# Patient Record
Sex: FEMALE | Race: WHITE | Employment: OTHER | URBAN - METROPOLITAN AREA
[De-identification: names, ages, dates, MRNs, and addresses within clinical notes are randomized per-mention and may not be internally consistent; named-entity substitution may affect disease eponyms.]

---

## 2019-07-14 ENCOUNTER — APPOINTMENT (OUTPATIENT)
Dept: GENERAL RADIOLOGY | Facility: CLINIC | Age: 74
End: 2019-07-14
Attending: EMERGENCY MEDICINE
Payer: MEDICARE

## 2019-07-14 ENCOUNTER — APPOINTMENT (OUTPATIENT)
Dept: ULTRASOUND IMAGING | Facility: CLINIC | Age: 74
End: 2019-07-14
Attending: EMERGENCY MEDICINE
Payer: MEDICARE

## 2019-07-14 ENCOUNTER — HOSPITAL ENCOUNTER (EMERGENCY)
Facility: CLINIC | Age: 74
Discharge: HOME | End: 2019-07-14
Attending: EMERGENCY MEDICINE | Admitting: EMERGENCY MEDICINE
Payer: MEDICARE

## 2019-07-14 VITALS
WEIGHT: 200 LBS | HEART RATE: 92 BPM | OXYGEN SATURATION: 98 % | DIASTOLIC BLOOD PRESSURE: 96 MMHG | SYSTOLIC BLOOD PRESSURE: 180 MMHG | RESPIRATION RATE: 20 BRPM | TEMPERATURE: 98.4 F

## 2019-07-14 DIAGNOSIS — R03.0 ELEVATED BLOOD PRESSURE READING WITHOUT DIAGNOSIS OF HYPERTENSION: ICD-10-CM

## 2019-07-14 DIAGNOSIS — R60.0 LOWER EXTREMITY EDEMA: ICD-10-CM

## 2019-07-14 LAB
ALBUMIN SERPL-MCNC: 4.3 G/DL (ref 3.4–5)
ALP SERPL-CCNC: 97 U/L (ref 40–150)
ALT SERPL W P-5'-P-CCNC: 27 U/L (ref 0–50)
ANION GAP SERPL CALCULATED.3IONS-SCNC: 6 MMOL/L (ref 3–14)
AST SERPL W P-5'-P-CCNC: 21 U/L (ref 0–45)
BASOPHILS # BLD AUTO: 0.1 10E9/L (ref 0–0.2)
BASOPHILS NFR BLD AUTO: 0.6 %
BILIRUB SERPL-MCNC: 0.5 MG/DL (ref 0.2–1.3)
BUN SERPL-MCNC: 16 MG/DL (ref 7–30)
CALCIUM SERPL-MCNC: 9.3 MG/DL (ref 8.5–10.1)
CHLORIDE SERPL-SCNC: 108 MMOL/L (ref 94–109)
CO2 SERPL-SCNC: 26 MMOL/L (ref 20–32)
CREAT SERPL-MCNC: 0.89 MG/DL (ref 0.52–1.04)
CRP SERPL-MCNC: <2.9 MG/L (ref 0–8)
DIFFERENTIAL METHOD BLD: NORMAL
EOSINOPHIL # BLD AUTO: 0.3 10E9/L (ref 0–0.7)
EOSINOPHIL NFR BLD AUTO: 3.4 %
ERYTHROCYTE [DISTWIDTH] IN BLOOD BY AUTOMATED COUNT: 13.2 % (ref 10–15)
ERYTHROCYTE [SEDIMENTATION RATE] IN BLOOD BY WESTERGREN METHOD: 6 MM/H (ref 0–30)
GFR SERPL CREATININE-BSD FRML MDRD: 64 ML/MIN/{1.73_M2}
GLUCOSE SERPL-MCNC: 104 MG/DL (ref 70–99)
HCT VFR BLD AUTO: 45.8 % (ref 35–47)
HGB BLD-MCNC: 15.3 G/DL (ref 11.7–15.7)
IMM GRANULOCYTES # BLD: 0 10E9/L (ref 0–0.4)
IMM GRANULOCYTES NFR BLD: 0.5 %
LYMPHOCYTES # BLD AUTO: 1.8 10E9/L (ref 0.8–5.3)
LYMPHOCYTES NFR BLD AUTO: 20.2 %
MCH RBC QN AUTO: 31.2 PG (ref 26.5–33)
MCHC RBC AUTO-ENTMCNC: 33.4 G/DL (ref 31.5–36.5)
MCV RBC AUTO: 94 FL (ref 78–100)
MONOCYTES # BLD AUTO: 0.7 10E9/L (ref 0–1.3)
MONOCYTES NFR BLD AUTO: 8.2 %
NEUTROPHILS # BLD AUTO: 5.9 10E9/L (ref 1.6–8.3)
NEUTROPHILS NFR BLD AUTO: 67.1 %
NRBC # BLD AUTO: 0 10*3/UL
NRBC BLD AUTO-RTO: 0 /100
PLATELET # BLD AUTO: 233 10E9/L (ref 150–450)
POTASSIUM SERPL-SCNC: 3.9 MMOL/L (ref 3.4–5.3)
PROT SERPL-MCNC: 7.8 G/DL (ref 6.8–8.8)
RBC # BLD AUTO: 4.9 10E12/L (ref 3.8–5.2)
SODIUM SERPL-SCNC: 140 MMOL/L (ref 133–144)
WBC # BLD AUTO: 8.9 10E9/L (ref 4–11)

## 2019-07-14 PROCEDURE — 25000128 H RX IP 250 OP 636: Performed by: EMERGENCY MEDICINE

## 2019-07-14 PROCEDURE — 96375 TX/PRO/DX INJ NEW DRUG ADDON: CPT

## 2019-07-14 PROCEDURE — 85025 COMPLETE CBC W/AUTO DIFF WBC: CPT | Performed by: EMERGENCY MEDICINE

## 2019-07-14 PROCEDURE — 99285 EMERGENCY DEPT VISIT HI MDM: CPT | Mod: 25

## 2019-07-14 PROCEDURE — 85652 RBC SED RATE AUTOMATED: CPT | Performed by: EMERGENCY MEDICINE

## 2019-07-14 PROCEDURE — 73610 X-RAY EXAM OF ANKLE: CPT | Mod: RT

## 2019-07-14 PROCEDURE — 93971 EXTREMITY STUDY: CPT | Mod: RT

## 2019-07-14 PROCEDURE — 86140 C-REACTIVE PROTEIN: CPT | Performed by: EMERGENCY MEDICINE

## 2019-07-14 PROCEDURE — 96374 THER/PROPH/DIAG INJ IV PUSH: CPT

## 2019-07-14 PROCEDURE — 80053 COMPREHEN METABOLIC PANEL: CPT | Performed by: EMERGENCY MEDICINE

## 2019-07-14 RX ORDER — LIDOCAINE 40 MG/G
CREAM TOPICAL
Status: DISCONTINUED | OUTPATIENT
Start: 2019-07-14 | End: 2019-07-15 | Stop reason: HOSPADM

## 2019-07-14 RX ORDER — LABETALOL 20 MG/4 ML (5 MG/ML) INTRAVENOUS SYRINGE
20 ONCE
Status: COMPLETED | OUTPATIENT
Start: 2019-07-14 | End: 2019-07-14

## 2019-07-14 RX ORDER — HYDRALAZINE HYDROCHLORIDE 20 MG/ML
5 INJECTION INTRAMUSCULAR; INTRAVENOUS ONCE
Status: COMPLETED | OUTPATIENT
Start: 2019-07-14 | End: 2019-07-14

## 2019-07-14 RX ADMIN — LABETALOL 20 MG/4 ML (5 MG/ML) INTRAVENOUS SYRINGE 20 MG: at 22:29

## 2019-07-14 RX ADMIN — HYDRALAZINE HYDROCHLORIDE 5 MG: 20 INJECTION INTRAMUSCULAR; INTRAVENOUS at 20:07

## 2019-07-14 ASSESSMENT — ENCOUNTER SYMPTOMS
JOINT SWELLING: 1
FEVER: 0
ARTHRALGIAS: 0
CHILLS: 0

## 2019-07-14 NOTE — ED AVS SNAPSHOT
[facility] Emergency Department  [address]  [facility] [address]  Phone:  [phone]  Fax:  [phone]                                    Mali James   MRN: [id number]    Department:  [facility] Emergency Department   Date of Visit:  7/14/2019           After Visit Summary Signature Page    I have received my discharge instructions, and my questions have been answered. I have discussed any challenges I see with this plan with the nurse or doctor.    ..........................................................................................................................................  Patient/Patient Representative Signature      ..........................................................................................................................................  Patient Representative Print Name and Relationship to Patient    ..................................................               ................................................  Date                                   Time    ..........................................................................................................................................  Reviewed by Signature/Title    ...................................................              ..............................................  Date                                               Time          22EPIC Rev 08/18

## 2019-07-15 NOTE — ED PROVIDER NOTES
History     Chief Complaint:  Joint swelling and hypertension    HPI   Mali James is a 73 year old female with a history of hypertension, hyperlipidemia, and cancer who presents to the emergency department for evaluation of ankle swelling and hypertension. The patient reports that earlier today, she noticed that her right ankle was swelling. She presented to urgent care where she was told that both ankles were swollen and that she might have an infection, prompting her visit. She denies any fever, chills or pain. She mentions that she had kidney cancer surgery in 2004 and that she thinks her right leg has been slightly larger than the right since; she states as well that she can walk without any joint pain and feels that the issue is not related to her ankles or feet but to swollen tissues.  She also worked in the garden all weekend and states she got bit up by the gnats on her legs since she was wearing shorts.  They do itch some.  Lastly, she states that her blood pressure was last checked at her PCP office in May and it was elevated, but she and her doctor opted to keep an eye on it.  The urgent care doctor who saw her today sent her here because of the swelling and the increased blood pressure.  The patient states she has had not chest pain, shortness of breath, swelling about her belt line.  She denies headaches, blurred vision.  In general, she states that she feels well.    Allergies:  No Known Drug Allergies    Medications:    Lisinopril     Past Medical History:    Kidney cancer  Hypertension  Hyperlipidemia    Past Surgical History:    Rib removal, to remove kidney cancer (per patient)    Family History:    Breast cancer    Social History:  The patient presents alone.  Smoking Status: Never  Alcohol Use: Not on file   Drug Use: Not on file  Marital Status:       Review of Systems   Constitutional: Negative for chills and fever.   Musculoskeletal: Positive for joint swelling. Negative for  arthralgias.   All other systems reviewed and are negative.        Physical Exam   Vitals:  Patient Vitals for the past 24 hrs:   BP Temp Temp src Pulse Heart Rate Resp SpO2 Weight   07/14/19 2228 (!) 196/100 -- -- -- 90 -- 99 % --   07/14/19 2122 (!) 192/108 -- -- 96 -- -- 98 % --   07/14/19 2030 (!) 184/92 -- -- 82 84 -- 100 % --   07/14/19 2007 (!) 207/103 -- -- -- -- -- -- --   07/14/19 2000 (!) 207/103 -- -- 87 -- -- 98 % --   07/14/19 1932 (!) 239/120 98.4  F (36.9  C) Temporal 76 -- 20 100 % 90.7 kg (200 lb)     Physical Exam  General: Patient is well appearing and relaxed.  HEENT:   The scalp and head appear normal    The pupils are equal, round, and reactive to light    Extraocular muscles are intact.    The nose is normal.    The oropharynx is normal.      Uvula is in the midline.    Neck:  Normal range of motion.    Lungs:  Clear.      No rales, no wheezing.      There is no tachypnea.  Non-labored.  Cardiac: Regular rate.      Normal S1. Loud S2    No pathological murmur/rub    Abdomen: Soft. No distension, no localized tenderness or rebound.  MS:  Normal tone. Normal movement of all extremities.   Neurologic:     Normal mentation.  No cranial nerve deficits.  No focal motor or sensory                            changes.      Speech normal.  Psych:  Awake.     Alert.      Normal affect.      Appropriate interactions.  Skin:  No rash.      No lesions.    1+ pitting edema over right ankle, dorsum of foot, and right leg at baseline is slightly swollen compared to the left  Early venous stasis changes in the pretibial regions right leg more than left and also has numerous bug bites over the both lower extremities.       Emergency Department Course     Imaging:  Radiographic findings were communicated with the patient who voiced understanding of the findings.    US Lower Extremity Venous Duplex Right  No DVT demonstrated.  Reading per radiology.    XR Ankle G/E 3 views, right  No acute osseous abnormality  demonstrated.  Reading per radiology.    Laboratory:  CBC: WNL. (WBC 8.9, HGB 15.3, )  CRP Inflammation: <2.9    Erthrocyte sedimentation: 6  CMP: Glucose 104 (H) o/w AWNL (Creatinine 0.89)    Interventions:  2007 Apresoline 5 mg IV  2229 Normodyne 20 mg IV    Emergency Department Course:  Nursing notes and vitals reviewed. 1948 I performed an exam of the patient as documented above.     IV inserted. Medicine administered as documented above. Blood drawn. This was sent to the lab for further testing, results above.    The patient was sent for a lower extremity ultrasound and ankle x-ray while in the emergency department, findings above.     2225 I rechecked and updated the patient.    2237 I rechecked the patient and discussed the results of her workup thus far.     Findings and plan explained to the Patient. Patient discharged home with instructions regarding supportive care, medications, and reasons to return. The importance of close follow-up was reviewed.     I personally reviewed the laboratory results with the Patient and answered all related questions prior to discharge.     Impression & Plan      Medical Decision Making:  Mali James is a 73 year old female who presented with a swollen and ankle and increased blood pressure from clinic. She stated that she is on lisinopril, but was lady time she was in the office in May they said her blood pressure was elevated and to keep an eye on it. She denies any headache, chest pain, or shortness of breath. She simple went into clinic today to have her right ankle examined because it seemed to be more swollen as did the foot. She denied any acute injury and says she always has some swelling in this right lower extremity. It seems more pronounced today than usual. X-ray was negative for bony abnormality or effusion and ultrasound was negative for DVT. She does have numerous bug bites. This could be an inflammatory response to all of the bug bites on her legs.  It also could be because she was on her feet all day working in the garden. In any event, we discussed her pitting edema and her venous stasis changes (right greater than left). I don't see any evidence of cellulitis though. The doctor who sent her over was concerned for cellulitis because the right lower leg seemed a little bit warmer than the left left. There really is no increased erythema nor induration to suggest infection.    We discussed elevation, perhaps a compression stocking on this side and follow up with her PCP this week.  They can recheck her blood pressure and discuss pedall edema at more length.     Diagnosis:    ICD-10-CM    1. Elevated blood pressure reading without diagnosis of hypertension R03.0    2. Lower extremity edema R60.0        Disposition:  discharged to home    Discharge Medications:     Medication List      There are no discharge medications for this visit.       I, Ronnie Milligan, am serving as a scribe on 7/14/2019 at 10:23 PM to personally document services performed by Jarvis Montilla MD based on my observations and the provider's statements to me.     Ronnie Milligan  7/14/2019   [facility] EMERGENCY DEPARTMENT       Jarvis Montilla MD  07/15/19 3750

## 2019-07-15 NOTE — DISCHARGE INSTRUCTIONS
Discharge Instructions  Hypertension - High Blood Pressure    During you visit to the Emergency Department, your blood pressure was higher than the recommended blood pressure.  This may be related to stress, pain, medication or other temporary conditions. In these cases, your blood pressure may return to normal on its own. If you have a history of high blood pressure, you may need to have your doctor adjust your medications. Sometimes, your high measurement here may indicate that you have developed high blood pressure that will stay high unless it is treated. Sudden very high blood pressure can cause problems, but usually high blood pressure causes problems over months to years.      Blood pressure is almost never lowered in the Emergency Department, because studies have shown that lowering blood pressure too quickly is much more dangerous than leaving it alone.    You need to follow up with your doctor in 1-3 days to get your blood pressure rechecked.     Return to the Emergency Department if you start to have:  A severe headache.  Chest pain.  Shortness of breath.  Weakness or numbness that affects one part of the body.  Confusion.  Vision changes.  Significant swelling of legs and/or eyes.  A reaction to any medication started in the Emergency Department.    What can I do to help myself?  Avoid alcohol.  Take any blood pressure medicine that you are prescribed.  Get a good night s sleep.  Lower your salt intake.  Exercise.  Lose weight.  Manage stress.    If blood pressure medication was started in the Emergency Department:  The medicine may not have an immediate effect. The body and brain determine what blood pressure you have. The medicine s job is to retrain the body s  thermostat  to a lower blood pressure.  You will need to follow up with your doctor to see how this medicine is working for you.  If you were given a prescription for medicine here today, be sure to read all of the information (including the  package insert) that comes with your prescription.  This will include important information about the medicine, its side effects, and any warnings that you need to know about.  The pharmacist who fills the prescription can provide more information and answer questions you may have about the medicine.  If you have questions or concerns that the pharmacist cannot address, please call or return to the Emergency Department.   Opioid Medication Information    Pain medications are among the most commonly prescribed medicines, so we are including this information for all our patients. If you did not receive pain medication or get a prescription for pain medicine, you can ignore it.     You may have been given a prescription for an opioid (narcotic) pain medicine and/or have received a pain medicine while here in the Emergency Department. These medicines can make you drowsy or impaired. You must not drive, operate dangerous equipment, or engage in any other dangerous activities while taking these medications. If you drive while taking these medications, you could be arrested for DUI, or driving under the influence. Do not drink any alcohol while you are taking these medications.     Opioid pain medications can cause addiction. If you have a history of chemical dependency of any type, you are at a higher risk of becoming addicted to pain medications.  Only take these prescribed medications to treat your pain when all other options have been tried. Take it for as short a time and as few doses as possible. Store your pain pills in a secure place, as they are frequently stolen and provide a dangerous opportunity for children or visitors in your house to start abusing these powerful medications. We will not replace any lost or stolen medicine.  As soon as your pain is better, you should flush all your remaining medication.     Many prescription pain medications contain Tylenol  (acetaminophen), including Vicodin , Tylenol #3 ,  Norco , Lortab , and Percocet .  You should not take any extra pills of Tylenol  if you are using these prescription medications or you can get very sick.  Do not ever take more than 3000 mg of acetaminophen in any 24 hour period.    All opioids tend to cause constipation. Drink plenty of water and eat foods that have a lot of fiber, such as fruits, vegetables, prune juice, apple juice and high fiber cereal.  Take a laxative if you don t move your bowels at least every other day. Miralax , Milk of Magnesia, Colace , or Senna  can be used to keep you regular.      Remember that you can always come back to the Emergency Department if you are not able to see your regular doctor in the amount of time listed above, if you get any new symptoms, or if there is anything that worries you.

## 2019-07-15 NOTE — ED TRIAGE NOTES
Patient noted swelling to right ankle today.  Seen at  where they also noted High BP.      Sent here for eval of ankle and BP.      ABCs intact.  Alert and oriented x 3.

## 2019-07-15 NOTE — ED NOTES
Pt reports rt ankle swelling today, states it swells a little once in a while but usually resolves. Pt does not check her BP at home, has not missed any meds.

## 2020-09-08 ENCOUNTER — APPOINTMENT (OUTPATIENT)
Dept: CT IMAGING | Facility: CLINIC | Age: 75
End: 2020-09-08
Attending: EMERGENCY MEDICINE
Payer: MEDICARE

## 2020-09-08 ENCOUNTER — HOSPITAL ENCOUNTER (EMERGENCY)
Facility: CLINIC | Age: 75
Discharge: HOME | End: 2020-09-08
Attending: EMERGENCY MEDICINE | Admitting: EMERGENCY MEDICINE
Payer: MEDICARE

## 2020-09-08 VITALS
SYSTOLIC BLOOD PRESSURE: 135 MMHG | DIASTOLIC BLOOD PRESSURE: 84 MMHG | TEMPERATURE: 96.5 F | WEIGHT: 155 LBS | RESPIRATION RATE: 15 BRPM | OXYGEN SATURATION: 96 % | HEART RATE: 85 BPM

## 2020-09-08 DIAGNOSIS — N28.89 RIGHT KIDNEY MASS: ICD-10-CM

## 2020-09-08 DIAGNOSIS — R55 VASOVAGAL NEAR SYNCOPE: ICD-10-CM

## 2020-09-08 DIAGNOSIS — M62.830 BACK MUSCLE SPASM: ICD-10-CM

## 2020-09-08 LAB
ALBUMIN SERPL-MCNC: 3.8 G/DL (ref 3.4–5)
ALP SERPL-CCNC: 75 U/L (ref 40–150)
ALT SERPL W P-5'-P-CCNC: 58 U/L (ref 0–50)
ANION GAP SERPL CALCULATED.3IONS-SCNC: 4 MMOL/L (ref 3–14)
AST SERPL W P-5'-P-CCNC: 87 U/L (ref 0–45)
BASOPHILS # BLD AUTO: 0 10E9/L (ref 0–0.2)
BASOPHILS NFR BLD AUTO: 0.3 %
BILIRUB SERPL-MCNC: 0.4 MG/DL (ref 0.2–1.3)
BUN SERPL-MCNC: 22 MG/DL (ref 7–30)
CALCIUM SERPL-MCNC: 8.6 MG/DL (ref 8.5–10.1)
CHLORIDE SERPL-SCNC: 106 MMOL/L (ref 94–109)
CO2 SERPL-SCNC: 28 MMOL/L (ref 20–32)
CREAT SERPL-MCNC: 0.91 MG/DL (ref 0.52–1.04)
DIFFERENTIAL METHOD BLD: NORMAL
EOSINOPHIL # BLD AUTO: 0.2 10E9/L (ref 0–0.7)
EOSINOPHIL NFR BLD AUTO: 1.8 %
ERYTHROCYTE [DISTWIDTH] IN BLOOD BY AUTOMATED COUNT: 13.3 % (ref 10–15)
GFR SERPL CREATININE-BSD FRML MDRD: 62 ML/MIN/{1.73_M2}
GLUCOSE SERPL-MCNC: 115 MG/DL (ref 70–99)
HCT VFR BLD AUTO: 41.1 % (ref 35–47)
HGB BLD-MCNC: 14.1 G/DL (ref 11.7–15.7)
IMM GRANULOCYTES # BLD: 0 10E9/L (ref 0–0.4)
IMM GRANULOCYTES NFR BLD: 0.2 %
INTERPRETATION ECG - MUSE: NORMAL
LIPASE SERPL-CCNC: 289 U/L (ref 73–393)
LYMPHOCYTES # BLD AUTO: 2.1 10E9/L (ref 0.8–5.3)
LYMPHOCYTES NFR BLD AUTO: 21.4 %
MCH RBC QN AUTO: 31.9 PG (ref 26.5–33)
MCHC RBC AUTO-ENTMCNC: 34.3 G/DL (ref 31.5–36.5)
MCV RBC AUTO: 93 FL (ref 78–100)
MONOCYTES # BLD AUTO: 0.7 10E9/L (ref 0–1.3)
MONOCYTES NFR BLD AUTO: 7.2 %
NEUTROPHILS # BLD AUTO: 6.8 10E9/L (ref 1.6–8.3)
NEUTROPHILS NFR BLD AUTO: 69.1 %
NRBC # BLD AUTO: 0 10*3/UL
NRBC BLD AUTO-RTO: 0 /100
PLATELET # BLD AUTO: 213 10E9/L (ref 150–450)
POTASSIUM SERPL-SCNC: 3.7 MMOL/L (ref 3.4–5.3)
PROT SERPL-MCNC: 7.2 G/DL (ref 6.8–8.8)
RBC # BLD AUTO: 4.42 10E12/L (ref 3.8–5.2)
SODIUM SERPL-SCNC: 138 MMOL/L (ref 133–144)
TROPONIN I SERPL-MCNC: <0.015 UG/L (ref 0–0.04)
TROPONIN I SERPL-MCNC: <0.015 UG/L (ref 0–0.04)
WBC # BLD AUTO: 9.9 10E9/L (ref 4–11)

## 2020-09-08 PROCEDURE — 25000125 ZZHC RX 250: Performed by: EMERGENCY MEDICINE

## 2020-09-08 PROCEDURE — 25000128 H RX IP 250 OP 636: Performed by: EMERGENCY MEDICINE

## 2020-09-08 PROCEDURE — 99285 EMERGENCY DEPT VISIT HI MDM: CPT | Mod: 25

## 2020-09-08 PROCEDURE — 83690 ASSAY OF LIPASE: CPT | Performed by: EMERGENCY MEDICINE

## 2020-09-08 PROCEDURE — 80053 COMPREHEN METABOLIC PANEL: CPT | Performed by: EMERGENCY MEDICINE

## 2020-09-08 PROCEDURE — 84484 ASSAY OF TROPONIN QUANT: CPT | Mod: 91 | Performed by: EMERGENCY MEDICINE

## 2020-09-08 PROCEDURE — 85025 COMPLETE CBC W/AUTO DIFF WBC: CPT | Performed by: EMERGENCY MEDICINE

## 2020-09-08 PROCEDURE — 74177 CT ABD & PELVIS W/CONTRAST: CPT

## 2020-09-08 PROCEDURE — 93005 ELECTROCARDIOGRAM TRACING: CPT

## 2020-09-08 RX ORDER — METOPROLOL TARTRATE 50 MG
50 TABLET ORAL 2 TIMES DAILY
COMMUNITY

## 2020-09-08 RX ORDER — LISINOPRIL 5 MG/1
5 TABLET ORAL DAILY
COMMUNITY

## 2020-09-08 RX ORDER — IOPAMIDOL 755 MG/ML
80 INJECTION, SOLUTION INTRAVASCULAR ONCE
Status: COMPLETED | OUTPATIENT
Start: 2020-09-08 | End: 2020-09-08

## 2020-09-08 RX ORDER — ROSUVASTATIN CALCIUM 5 MG/1
5 TABLET, COATED ORAL DAILY
COMMUNITY

## 2020-09-08 RX ADMIN — IOPAMIDOL 80 ML: 755 INJECTION, SOLUTION INTRAVENOUS at 03:14

## 2020-09-08 RX ADMIN — SODIUM CHLORIDE 70 ML: 9 INJECTION, SOLUTION INTRAVENOUS at 03:14

## 2020-09-08 ASSESSMENT — ENCOUNTER SYMPTOMS
WEAKNESS: 0
DIAPHORESIS: 1
CHILLS: 1
ROS GI COMMENTS: - STOOL INCONTINENCE
LIGHT-HEADEDNESS: 1
NUMBNESS: 0
BACK PAIN: 1

## 2020-09-08 NOTE — ED TRIAGE NOTES
, patient woke up and called EMS from mid thoracic back pain, sweating and chills.  EMS arrived and found her in heart block with HR 30-40, hypotension that has since resolved.

## 2020-09-08 NOTE — ED PROVIDER NOTES
History   Chief Complaint:  Hypotension; Chills; and Back Pain     HPI   Mali James is a 74 year old female who presents for evaluation of mid thoracic back pain, associated with chills, nausea, lightheadedness, and diaphoresis, that began after waking up from sleep. The patient states yesterday she went to her son's house to visit her new grandchild. She notes she was picking her grandchild up to visit with him, but she denies any other new change in activity. She was able to go to bed feeling baseline and woke up secondary to mid thoracic back pain while sleeping flat. She went to the bathroom and while walking back to bed, she began feeling nauseated. She attempted to walk around her house to alleviate her symptoms, but she became chilled, diaphoretic, and lightheaded. She laid down to alleviate her symptoms and was able to get the attention of her son, who called EMS for evaluation. On EMS arrival, the patient was hypotensive with a heart rate between 30-40.    Here, the patient states she feels improved in an upright position. She denies any history of similar back pain. She denies any chest pain, weakness, numbness, urinary or stool incontinence, or other symptoms prompting her presentation.     Allergies:  No Known Drug Allergies      Medications:    The patient is not currently taking any prescribed medications.     Past Medical History:    The patient denies any relevant past medical history.     Past Surgical History:    Mother - Breast cancer    Family History:    The patient denies any relevant family medical history.     Social History:  The patient was accompanied to the ED by EMS.  Smoking Status: Never  Smokeless Tobacco: Never  Alcohol Use: No  Drug Use: No    PCP: Chele Blanco   Marital status:     Review of Systems   Constitutional: Positive for chills and diaphoresis.   Cardiovascular: Negative for chest pain.   Gastrointestinal:        - Stool incontinence    Genitourinary:  Negative for enuresis.   Musculoskeletal: Positive for back pain.   Neurological: Positive for light-headedness. Negative for weakness and numbness.   All other systems reviewed and are negative.      Physical Exam     Patient Vitals for the past 24 hrs:   BP Temp Temp src Pulse Resp SpO2 Weight   09/08/20 0340 (!) 157/90 -- -- 97 15 -- --   09/08/20 0305 -- -- -- 93 20 -- --   09/08/20 0300 (!) 141/82 -- -- 85 17 -- --   09/08/20 0245 (!) 140/80 -- -- 76 17 96 % --   09/08/20 0232 (!) 158/87 -- -- 79 10 98 % --   09/08/20 0220 -- 96.5  F (35.8  C) Temporal -- -- -- 70.3 kg (155 lb)   09/08/20 0217 (!) 185/93 -- -- 87 18 100 % --     Physical Exam  General: Patient is alert and normal appearing.  HEENT: Head atraumatic    Eyes: pupils equal and reactive. Conjunctiva clear   Nares: patent   Oropharynx: no lesions, uvula midline, no palatal draping, normal voice, no trismus  Neck: Supple without lymphadenopathy, no meningismus  Chest: Heart regular rate and rhythm.   Lungs: Equal clear to auscultation with no wheeze or rales  Abdomen: Soft, non tender, nondistended, normal bowel sounds  Back: No costovertebral angle tenderness, no midline C, T or L spine tenderness  Neuro: Grossly nonfocal, normal speech, strength equal bilaterally, CN 2-12 intact  Extremities: No deformities, equal radial and DP pulses. No clubbing, cyanosis.  No edema  Skin: Warm and dry with no rash.     Emergency Department Course     ECG:  ECG taken at 0228, ECG read at 0234 by Jenni Fajardo MD  Normal sinus rhythm  Normal ECG  Rate 73 bpm. DC interval 162. QRS duration 96. QT/QTc 420/462. P-R-T axes 52 -26 37.      Imaging:  Radiology findings were communicated with the patient who voiced understanding of the findings.    CT Aortic Survey w Contrast  IMPRESSION:  1.  Negative for aortic dissection.  2.  Coronary artery disease.  3.  2.2 cm exophytic left renal lower pole lesion is not a simple cyst by density criteria and should be  evaluated further with ultrasound to exclude a solid mass.  4.  Colonic diverticulosis.  Reading per radiology.      Laboratory:  Laboratory findings were communicated with the patient who voiced understanding of the findings.    CBC: AWNL (WBC 9.9, HGB 14.1, )  CMP: Glucose 115 (H), Alt 58 (H), Ast 87 (H) o/w WNL (Creatinine 0.91)   Troponin (Collected 0218): <0.015   Troponin (Collected 0416): <0.015  Lipase: 289     Emergency Department Course:  Past medical records, nursing notes, and vitals reviewed.  EKG obtained in the ED, see results above.    The patient was sent for a CT Aortic Survey w Contrast while in the emergency department, results above.    IV was inserted and blood was drawn for laboratory testing, results above.     (0216)   I performed an exam of the patient as documented above. History obtained from patient.     (0421)   I rechecked the patient and discussed results and plan of care.     Findings and plan explained to the Patient. Patient discharged home with instructions regarding supportive care, medications, and reasons to return. The importance of close follow-up was reviewed. I personally reviewed the laboratory and imaging results with the Patient and answered all related questions prior to discharge.     Impression & Plan     Medical Decision Making:  Patient is a 74-year-old female presents the emergency department for acute onset of midthoracic back pain with some nausea and fullness in her chest that woke her from sleep.  Given the degree of pain she began to feel sweaty and nauseous and like she was going to pass out.  She woke her son and on arrival of EMS she was found to have a heart rate in 30s to 40s.  Since arrival here her pain is completely resolved and she is had normal sinus rhythm and hemodynamically stable.  EKG was obtained without any acute ischemic changes or dysrhythmia here.  Given the description of her pain she had a CT scan to rule out aortic dissection and  this was thankfully negative for aortic pathology.  Incidental renal mass was found on the right and she was informed of need for follow-up.  Do not suspect acute coronary syndrome as troponin and delta troponin are negative.  Patient is hemodynamically stable.  Minimal elevation of her LFTs but no right upper quadrant tenderness and lipase is normal.  She has complete resolution of her symptoms at this time.  I suspect she had vasovagal near syncope as a result of her pain and this resulted in her heart rate dropping to the 30s to 40s.  She has resolution of her symptoms at this time and normal vital signs.  She is agreeable with the plan for discharge with return precautions and follow-up instructions discussed.  All questions and concerns addressed.    Diagnosis:    ICD-10-CM    1. Right kidney mass  N28.89 Troponin I (now)   2. Back muscle spasm  M62.830    3. Vasovagal near syncope  R55      Disposition:  Discharged to home.    Scribe Disclosure:  Marin MATA, am serving as a scribe at 2:16 AM on 9/8/2020 to document services personally performed by Jenni Fajardo MD based on my observations and the provider's statements to me.  September 8, 2020   [facility] EMERGENCY DEPARTMENT       Jenni Fajardo MD  09/08/20 0458

## 2020-09-08 NOTE — ED AVS SNAPSHOT
Emergency Department  [address] [place] [address]  Phone:  [phone]  Fax:  [phone]                                    Mali James   MRN: [id number]    Department:   Emergency Department   Date of Visit:  9/8/2020           After Visit Summary Signature Page    I have received my discharge instructions, and my questions have been answered. I have discussed any challenges I see with this plan with the nurse or doctor.    ..........................................................................................................................................  Patient/Patient Representative Signature      ..........................................................................................................................................  Patient Representative Print Name and Relationship to Patient    ..................................................               ................................................  Date                                   Time    ..........................................................................................................................................  Reviewed by Signature/Title    ...................................................              ..............................................  Date                                               Time          22EPIC Rev 08/18